# Patient Record
Sex: MALE | Race: WHITE | Employment: OTHER | URBAN - METROPOLITAN AREA
[De-identification: names, ages, dates, MRNs, and addresses within clinical notes are randomized per-mention and may not be internally consistent; named-entity substitution may affect disease eponyms.]

---

## 2020-09-21 ENCOUNTER — TELEPHONE (OUTPATIENT)
Dept: FAMILY MEDICINE CLINIC | Age: 35
End: 2020-09-21

## 2020-09-21 NOTE — TELEPHONE ENCOUNTER
----- Message from Dagoberto Fragoso sent at 9/18/2020  3:49 PM EDT -----  Subject: Message to Provider    QUESTIONS  Information for Provider? scheduled 11/2 but needs something sooner;   patient was advised by dr Hany Malagon and amrit that if there werent any   avail appts to send a message to let them know and they will possibly fit   them in sooner;   ---------------------------------------------------------------------------  --------------  [address]  What is the best way for the office to contact you? OK to leave message on   voicemail  Preferred Call Back Phone Number? [phone]  ---------------------------------------------------------------------------  --------------  SCRIPT ANSWERS  Relationship to Patient? Other  Representative Name? [place] Harada  Is the Representative on the appropriate HIPAA document in Epic?  Yes

## 2020-09-21 NOTE — TELEPHONE ENCOUNTER
Please call the patient and see why he needs an appointment earlier. Okay to put him in any open office visit slot. Does not have to be a new patient slot.

## 2020-09-22 ENCOUNTER — VIRTUAL VISIT (OUTPATIENT)
Dept: FAMILY MEDICINE CLINIC | Age: 35
End: 2020-09-22
Payer: COMMERCIAL

## 2020-09-22 PROCEDURE — 4004F PT TOBACCO SCREEN RCVD TLK: CPT | Performed by: FAMILY MEDICINE

## 2020-09-22 PROCEDURE — 99204 OFFICE O/P NEW MOD 45 MIN: CPT | Performed by: FAMILY MEDICINE

## 2020-09-22 PROCEDURE — G8427 DOCREV CUR MEDS BY ELIG CLIN: HCPCS | Performed by: FAMILY MEDICINE

## 2020-09-22 PROCEDURE — G8421 BMI NOT CALCULATED: HCPCS | Performed by: FAMILY MEDICINE

## 2020-09-22 RX ORDER — BUPRENORPHINE AND NALOXONE 8; 2 MG/1; MG/1
1 FILM, SOLUBLE BUCCAL; SUBLINGUAL 2 TIMES DAILY
COMMUNITY

## 2020-09-22 RX ORDER — CITALOPRAM 40 MG/1
40 TABLET ORAL DAILY
COMMUNITY
End: 2020-11-02

## 2020-09-22 SDOH — HEALTH STABILITY: MENTAL HEALTH: HOW OFTEN DO YOU HAVE A DRINK CONTAINING ALCOHOL?: NEVER

## 2020-09-22 ASSESSMENT — PATIENT HEALTH QUESTIONNAIRE - PHQ9
SUM OF ALL RESPONSES TO PHQ9 QUESTIONS 1 & 2: 2
SUM OF ALL RESPONSES TO PHQ QUESTIONS 1-9: 2
2. FEELING DOWN, DEPRESSED OR HOPELESS: 1
1. LITTLE INTEREST OR PLEASURE IN DOING THINGS: 1
SUM OF ALL RESPONSES TO PHQ QUESTIONS 1-9: 2
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
## 2020-10-07 ENCOUNTER — TELEPHONE (OUTPATIENT)
Dept: FAMILY MEDICINE CLINIC | Age: 35
End: 2020-10-07

## 2020-10-07 ENCOUNTER — NURSE TRIAGE (OUTPATIENT)
Dept: OTHER | Facility: CLINIC | Age: 35
End: 2020-10-07

## 2020-10-07 NOTE — TELEPHONE ENCOUNTER
Patient refuses to go to ER, Patient's fiance would like him to be seen asap by Dr Irlanda Ashley in office. Only wants to see Dr Irlanda Ashley.

## 2020-10-07 NOTE — TELEPHONE ENCOUNTER
Recommend he go to ER now  Will leave for Dr. Shaun Azar as well to see if she wants to double book him tomorrow

## 2020-10-07 NOTE — TELEPHONE ENCOUNTER
Reason for Disposition   [1] Bipolar disorder (manic depression) AND [2] unable to do any of normal activities (e.g., self care, school, work; in [place] [address] to baseline). Answer Assessment - Initial Assessment Questions  1. CONCERN: \"What happened that made you call today? \"      Won't get out of bed. Severe anxiety attacks   2. BIPOLAR SYMPTOM SCREENING: \"How are you feeling overall, is your bipolar disorder under good control? \"    - NIRU SYMPTOMS (e.g., increased energy, decreased sleeping, hyperactivity, grandiosity)     - DEPRESSION SYMPTOMS (e.g., decreased energy, increased sleeping or difficulty sleeping, difficulty concentrating, feelings of sadness, guilt, hopelessness, or worthlessness)      Not getting out of bed   3. RISK OF HARM - SUICIDAL IDEATION:  \"Do you ever have thoughts of hurting or killing yourself? \"  (e.g., yes, no, no but preoccupation with thoughts about death)    - INTENT:  \"Do you have thoughts of hurting or killing yourself right NOW? \" (e.g., yes, no, N/A)    - PLAN: \"Do you have a specific plan for how you would do this? \" (e.g., gun, knife, overdose, no plan, N/A)      No - recovering addict   4. RISK OF HARM - HOMICIDAL IDEATION:  \"Do you ever have thoughts of hurting or killing someone else? \"  (e.g., yes, no, no but preoccupation with thoughts about death)    - INTENT:  \"Do you have thoughts of hurting or killing someone right NOW? \" (e.g., yes, no, N/A)    - PLAN: \"Do you have a specific plan for how you would do this? \" (e.g., gun, knife, no plan, N/A)       no  5. FUNCTIONAL IMPAIRMENT: \"How have things been going for you overall in your life? Have you had any more difficulties than usual doing your normal daily activities? \"  (e.g., better, same, worse; self-care, school, work, interactions)     In bed for 4 days straight. Then 2 days of anxiety attacks. Now back to bed. 6. SUPPORT: \"Who is with you now? \" \"Who do you live with?\" \"Do you have family or friends nearby who you can talk to? \"       Kam Guardado is with him now . 7. THERAPIST: \"Do you have a counselor or therapist? Name? \"      No does not   8. STRESSORS: \"Has there been any new stress or recent changes in your life? \" no      Stopped taking Celexa 1 month ago  9. DRUG ABUSE/ALCOHOL: \"Do you drink alcohol or use any illegal drugs? \"       No but has the intent to use heroin again   10. OTHER: \"Do you have any other health or medical symptoms right now? \" (e.g., fever)        no  11. PREGNANCY: \"Is there any chance you are pregnant? \" \"When was your last menstrual period? \"        no    Protocols used: BIPOLAR DISORDER (MANIC DEPRESSION)-ADULT-AH    Patient called pre-service center [facility] with red flag complaint. Brief description of triage: pt is having severe depressive state for 4 days not getting out of bed. He has 2 days of constant anxiety attacks then went back to bed yesterday and does not leave the bed. Leighton is very concerned as Leopold Manes has expressed the desire to use heroin again. Please call finance directly, Sureshfrandy Ocasio- [phone]    Triage indicates for patient to go to ED now    Care advice provided, patient verbalizes understanding; denies any ot questions or concerns; instructed to call back for any new or worsening symptoms. Attention Provider: Thank you for allowing me to participate in the care of your patient. The patient was connected to triage in response to information provided to the [facility]. Please do not respond through this encounter as the response is not directed to a shared pool.

## 2020-10-07 NOTE — TELEPHONE ENCOUNTER
Patient refuses to go to ER, Patient's fiance would like patient to see Dr Edward rodriguez in office, please advise on booking an appointment.

## 2020-10-07 NOTE — TELEPHONE ENCOUNTER
See Nurse Triage Note    Per Nurse, patients girlfriend states that she believes that Dr. Jam Rhoades is the only one that can calm him down.     He does not want to go to the hospital

## 2020-10-08 NOTE — TELEPHONE ENCOUNTER
Patient's fiance will call back. Please schedule patient at 12:00 or 12:40 today with Dr Leonidas Iniguez.

## 2020-10-08 NOTE — TELEPHONE ENCOUNTER
Patient can come in a be seen this morning. If not able to come in please encourage them to go to the ER.

## 2020-10-08 NOTE — TELEPHONE ENCOUNTER
Patient's fiance will call back.  Please schedule patient at 12:00 or 12:40 today with Dr Irlanda Ashley

## 2020-10-12 NOTE — TELEPHONE ENCOUNTER
[phone] (home)  - went to        Girl friend - [phone] she spoke to patient, he declines appointment at this time. No further action is needed for this encounter.

## 2020-11-02 ENCOUNTER — OFFICE VISIT (OUTPATIENT)
Dept: FAMILY MEDICINE CLINIC | Age: 35
End: 2020-11-02
Payer: COMMERCIAL

## 2020-11-02 VITALS
HEART RATE: 59 BPM | SYSTOLIC BLOOD PRESSURE: 120 MMHG | OXYGEN SATURATION: 98 % | HEIGHT: 74 IN | BODY MASS INDEX: 24.64 KG/M2 | DIASTOLIC BLOOD PRESSURE: 80 MMHG | WEIGHT: 192 LBS

## 2020-11-02 PROBLEM — M79.641 RIGHT HAND PAIN: Status: ACTIVE | Noted: 2020-11-02

## 2020-11-02 PROCEDURE — 99395 PREV VISIT EST AGE 18-39: CPT | Performed by: FAMILY MEDICINE

## 2020-11-02 PROCEDURE — G8484 FLU IMMUNIZE NO ADMIN: HCPCS | Performed by: FAMILY MEDICINE

## 2020-11-02 RX ORDER — CLONIDINE HYDROCHLORIDE 0.1 MG/1
TABLET ORAL PRN
COMMUNITY
Start: 2020-10-07

## 2020-11-02 RX ORDER — CYCLOBENZAPRINE HCL 5 MG
5 TABLET ORAL NIGHTLY PRN
Qty: 30 TABLET | Refills: 0 | Status: SHIPPED | OUTPATIENT
Start: 2020-11-02 | End: 2020-11-12

## 2020-11-02 RX ORDER — DOCUSATE SODIUM 100 MG
CAPSULE ORAL
COMMUNITY
Start: 2020-09-21

## 2020-11-02 RX ORDER — PROMETHAZINE HYDROCHLORIDE 25 MG/1
25 TABLET ORAL 3 TIMES DAILY PRN
Qty: 30 TABLET | Refills: 0 | Status: SHIPPED | OUTPATIENT
Start: 2020-11-02 | End: 2020-11-09

## 2020-11-02 RX ORDER — ONDANSETRON HYDROCHLORIDE 8 MG/1
TABLET, FILM COATED ORAL
COMMUNITY
Start: 2020-09-21
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
